# Patient Record
Sex: MALE | Race: WHITE | Employment: UNEMPLOYED | URBAN - METROPOLITAN AREA
[De-identification: names, ages, dates, MRNs, and addresses within clinical notes are randomized per-mention and may not be internally consistent; named-entity substitution may affect disease eponyms.]

---

## 2018-04-06 ENCOUNTER — THERAPY VISIT (OUTPATIENT)
Dept: PHYSICAL THERAPY | Facility: CLINIC | Age: 23
End: 2018-04-06
Payer: COMMERCIAL

## 2018-04-06 DIAGNOSIS — M25.511 CHRONIC RIGHT SHOULDER PAIN: Primary | ICD-10-CM

## 2018-04-06 DIAGNOSIS — G89.29 CHRONIC RIGHT SHOULDER PAIN: Primary | ICD-10-CM

## 2018-04-06 PROCEDURE — 97110 THERAPEUTIC EXERCISES: CPT | Mod: GP | Performed by: PHYSICAL THERAPIST

## 2018-04-06 PROCEDURE — 97161 PT EVAL LOW COMPLEX 20 MIN: CPT | Mod: GP | Performed by: PHYSICAL THERAPIST

## 2018-04-06 PROCEDURE — 97112 NEUROMUSCULAR REEDUCATION: CPT | Mod: GP | Performed by: PHYSICAL THERAPIST

## 2018-04-06 NOTE — LETTER
[facility]  [address]  [place] [address]  [phone]    2018    Re: Alexis Welsh   :   1995  MRN:  [id number]   REFERRING PHYSICIAN:   [facility]    Date of Initial Evaluation:  18  Visits:  Rxs Used: 1  Reason for Referral:  Chronic right shoulder pain    [facility] Initial Evaluation    Subjective:  HPI Comments: JUAN  37    Patient is planning surgery with Dr. Dinero in the Fall of   Patient is a 22 year old male presenting with rehab right shoulder hpi. The history is provided by the patient. No  was used.   Alexis Welsh is a 22 year old male with a right shoulder condition.  Condition occurred with:  Other (Lifting weights and body weight dips ).  Condition occurred: during recreation/sport.  This is a chronic condition  2017 .    Patient reports pain:  Anterior, lateral and posterior.  Radiates to:  Upper arm.  Pain is described as aching and is intermittent and reported as 5/10 (Constant 2/10 pain ).  Associated symptoms:  Painful arc, loss of motion/stiffness and loss of strength. Pain is the same all the time.  Symptoms are exacerbated by using arm overhead, using arm behind back, using arm at shoulder level, lifting, lying on extremity and carrying and relieved by rest.  Since onset symptoms are unchanged.  Special tests:  X-ray and MRI.      General health as reported by patient is good.  Pertinent medical history includes:  Overweight.  Medical allergies: no.  Other surgeries include:  Orthopedic surgery (wrist/forearm surgery ).  Current medications:  None as reported by the patient.  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  Barriers include:  None as reported by the patient.  Red flags:  None as reported by the patient.    Objective:  Standing Alignment:     Cervical/Thoracic:  Forward head  Shoulder/UE:  Rounded shoulders and scapular winging R    Gait:    Gait Type:  Normal   Weight Bearing Status:  WBAT   Assistive Devices:  None  Flexibility/Screens:   Negative screens: Cervical   Neurological: He is alert. He has normal strength and normal reflexes. No cranial nerve deficit or sensory deficit.     Shoulder Evaluation:  ROM:    PROM:    Flexion:  Left:  170    Right: 170    Internal Rotation:  Left:  90    Right:  90  External Rotation:  Left:  90    Right:  80    Strength:  : weak and painful ER, IR, Adduction and Abduction.     Assessment/Plan:    Patient is a 22 year old male with right side shoulder complaints.    Patient has the following significant findings with corresponding treatment plan.                Diagnosis 1:  R shoulder pain, Labrum tear, paralabral cyst, scap dyskinesia       Therapy Evaluation Codes:   1) History comprised of:   Personal factors that impact the plan of care:      Past/current experiences, Time since onset of symptoms and Work status.    Comorbidity factors that impact the plan of care are:      None.     Medications impacting care: None.  2) Examination of Body Systems comprised of:   Body structures and functions that impact the plan of care:      Shoulder.   Activity limitations that impact the plan of care are:      Dressing, Lifting, Working, Sleeping and Laying down.  3) Clinical presentation characteristics are:   Stable/Uncomplicated.  4) Decision-Making    Low complexity using standardized patient assessment instrument and/or measureable assessment of functional outcome.    Cumulative Therapy Evaluation is: Low complexity.  Previous and current functional limitations:  (See Goal Flow Sheet for this information)    Short term and Long term goals: (See Goal Flow Sheet for this information)   Communication ability:  Patient appears to be able to clearly communicate and understand verbal and written communication and follow  directions correctly.  Treatment Explanation - The following has been discussed with the patient:   RX ordered/plan of care  Anticipated outcomes  Possible risks and side effects  This patient would benefit from PT intervention to resume normal activities.   Rehab potential is good.    Frequency:  1 X week, to every other week once daily  Duration:  for 12 weeks  Discharge Plan:  Achieve all LTG.  Independent in home treatment program.  Reach maximal therapeutic benefit.  Follow up with Dr. Dinero in July 2018.   Rehab focus to self directed care, emphasis to scap stability and endurance RTC strength     Thank you for your referral.    INQUIRIES  Therapist: Yon Heath PT  [facility]  [address] [place] [address]  Phone: [phone]  Fax: [phone]

## 2018-04-08 PROBLEM — M25.511 CHRONIC RIGHT SHOULDER PAIN: Status: ACTIVE | Noted: 2018-04-08

## 2018-04-08 PROBLEM — G89.29 CHRONIC RIGHT SHOULDER PAIN: Status: ACTIVE | Noted: 2018-04-08

## 2018-04-09 NOTE — PROGRESS NOTES
[facility] Initial Evaluation  Subjective:  HPI Comments: JUAN  37/100      Patient is planning surgery with Dr. Dinero in the Fall of 2018    Patient is a 22 year old male presenting with rehab right shoulder hpi. The history is provided by the patient. No  was used.   Alexis Welsh is a 22 year old male with a right shoulder condition.  Condition occurred with:  Other (Lifting weights and body weight dips ).  Condition occurred: during recreation/sport.  This is a chronic condition  January 2017 .    Patient reports pain:  Anterior, lateral and posterior.  Radiates to:  Upper arm.  Pain is described as aching and is intermittent and reported as 5/10 (Constant 2/10 pain ).  Associated symptoms:  Painful arc, loss of motion/stiffness and loss of strength. Pain is the same all the time.  Symptoms are exacerbated by using arm overhead, using arm behind back, using arm at shoulder level, lifting, lying on extremity and carrying and relieved by rest.  Since onset symptoms are unchanged.  Special tests:  X-ray and MRI.      General health as reported by patient is good.  Pertinent medical history includes:  Overweight.  Medical allergies: no.  Other surgeries include:  Orthopedic surgery (wrist/forearm surgery ).  Current medications:  None as reported by the patient.  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    Barriers include:  None as reported by the patient.    Red flags:  None as reported by the patient.                        Objective:  Standing Alignment:    Cervical/Thoracic:  Forward head  Shoulder/UE:  Rounded shoulders and scapular winging R              Gait:    Gait Type:  Normal   Weight Bearing Status:  WBAT   Assistive Devices:  None      Flexibility/Screens:   Negative screens: Cervical           Neurological: He is alert. He has normal strength and normal  reflexes. No cranial nerve deficit or sensory deficit.                      Shoulder Evaluation:  ROM:    PROM:    Flexion:  Left:  170    Right: 170          Internal Rotation:  Left:  90    Right:  90  External Rotation:  Left:  90    Right:  80                    Strength:  : weak and painful ER, IR, Adduction and Abduction.                                                                General     ROS    Assessment/Plan:    Patient is a 22 year old male with right side shoulder complaints.    Patient has the following significant findings with corresponding treatment plan.                Diagnosis 1:  R shoulder pain, Labrum tear, paralabral cyst, scap dyskinesia       Therapy Evaluation Codes:   1) History comprised of:   Personal factors that impact the plan of care:      Past/current experiences, Time since onset of symptoms and Work status.    Comorbidity factors that impact the plan of care are:      None.     Medications impacting care: None.  2) Examination of Body Systems comprised of:   Body structures and functions that impact the plan of care:      Shoulder.   Activity limitations that impact the plan of care are:      Dressing, Lifting, Working, Sleeping and Laying down.  3) Clinical presentation characteristics are:   Stable/Uncomplicated.  4) Decision-Making    Low complexity using standardized patient assessment instrument and/or measureable assessment of functional outcome.  Cumulative Therapy Evaluation is: Low complexity.    Previous and current functional limitations:  (See Goal Flow Sheet for this information)    Short term and Long term goals: (See Goal Flow Sheet for this information)     Communication ability:  Patient appears to be able to clearly communicate and understand verbal and written communication and follow directions correctly.  Treatment Explanation - The following has been discussed with the patient:   RX ordered/plan of care  Anticipated outcomes  Possible risks and side  effects  This patient would benefit from PT intervention to resume normal activities.   Rehab potential is good.    Frequency:  1 X week, to every other week once daily  Duration:  for 12 weeks  Discharge Plan:  Achieve all LTG.  Independent in home treatment program.  Reach maximal therapeutic benefit.    Follow up with Dr. Dinero in July 2018.     Rehab focus to self directed care, emphasis to scap stability and endurance RTC strength     Please refer to the daily flowsheet for treatment today, total treatment time and time spent performing 1:1 timed codes.

## 2018-04-20 ENCOUNTER — THERAPY VISIT (OUTPATIENT)
Dept: PHYSICAL THERAPY | Facility: CLINIC | Age: 23
End: 2018-04-20
Payer: COMMERCIAL

## 2018-04-20 DIAGNOSIS — G89.29 CHRONIC RIGHT SHOULDER PAIN: ICD-10-CM

## 2018-04-20 DIAGNOSIS — M25.511 CHRONIC RIGHT SHOULDER PAIN: ICD-10-CM

## 2018-04-20 PROCEDURE — 97110 THERAPEUTIC EXERCISES: CPT | Mod: GP | Performed by: PHYSICAL THERAPIST

## 2018-04-20 PROCEDURE — 97112 NEUROMUSCULAR REEDUCATION: CPT | Mod: GP | Performed by: PHYSICAL THERAPIST

## 2018-05-15 ENCOUNTER — THERAPY VISIT (OUTPATIENT)
Dept: PHYSICAL THERAPY | Facility: CLINIC | Age: 23
End: 2018-05-15
Payer: COMMERCIAL

## 2018-05-15 DIAGNOSIS — M25.511 CHRONIC RIGHT SHOULDER PAIN: ICD-10-CM

## 2018-05-15 DIAGNOSIS — G89.29 CHRONIC RIGHT SHOULDER PAIN: ICD-10-CM

## 2018-05-15 PROCEDURE — 97112 NEUROMUSCULAR REEDUCATION: CPT | Mod: GP | Performed by: PHYSICAL THERAPIST

## 2018-05-15 PROCEDURE — 97110 THERAPEUTIC EXERCISES: CPT | Mod: GP | Performed by: PHYSICAL THERAPIST

## 2018-05-15 NOTE — PROGRESS NOTES
Subjective:  HPI                    Objective:  System    Physical Exam    General     ROS    Assessment/Plan:    PROGRESS  REPORT    Progress reporting period is from April 8. 2018 to May 15. 2018. 3 visits     SUBJECTIVE  Subjective: Patient is painfree. He is able to lay on the R shoulder at night and pretty much full go with day to day activity. SPADI 3/100. Patient happy with his outcome.     Current Pain level: 1/10   Initial Pain level: 7/10   Changes in function: Yes, see goal flow sheet for change in function    ;   ,         OBJECTIVE  Objective: No painful arc. ROM restored, including posterior reach to T7 bialterally . MMT 5-/5 strength, slight sore with adduction. Endurance strength equal . Recommend to hold off weight room work still at this point. Continue strengthening light weight , high reps strength program 3x/weekfor the next month. Consult plan with Dr. Dinero in July 2018. PT consult in 2-3 weeks.       ASSESSMENT/PLAN  Updated problem list and treatment plan: Diagnosis 1:  R shoulder internal derangement     STG/LTGs have been met or progress has been made towards goals:  Yes (See Goal flow sheet completed today.)  Assessment of Progress: The patient's condition is improving.  The patient's condition has potential to improve.  Self Management Plans:  Patient has been instructed in a home treatment program.  I have re-evaluated this patient and find that the nature, scope, duration and intensity of the therapy is appropriate for the medical condition of the patient.        Recommendations:  This patient would benefit from further evaluation.    Please refer to the daily flowsheet for treatment today, total treatment time and time spent performing 1:1 timed codes.

## 2018-05-15 NOTE — LETTER
[facility]  [address]  [place] [address]  [phone]    May 16, 2018    Re: Alexis Welsh   :   1995  MRN:  [id number]   REFERRING PHYSICIAN:   [facility]    Date of Initial Evaluation:  18  Visits:  Rxs Used: 3  Reason for Referral:  Chronic right shoulder pain    PROGRESS  REPORT  Progress reporting period is from  to May 15. 2018. 3 visits     SUBJECTIVE  Subjective: Patient is painfree. He is able to lay on the R shoulder at night and pretty much full go with day to day activity. SPADI 3/100. Patient happy with his outcome.   Current Pain level: 10   Initial Pain level: 7/10   Changes in function: Yes, see goal flow sheet for change in function     OBJECTIVE  Objective: No painful arc. ROM restored, including posterior reach to T7 bialterally . MMT 5-/5 strength, slight sore with adduction. Endurance strength equal . Recommend to hold off weight room work still at this point. Continue strengthening light weight , high reps strength program 3x/weekfor the next month. Consult plan with Dr. Dinero in 2018. PT consult in 2-3 weeks.       ASSESSMENT/PLAN  Updated problem list and treatment plan: Diagnosis 1:  R shoulder internal derangement     STG/LTGs have been met or progress has been made towards goals:  Yes (See Goal flow sheet completed today.)  Assessment of Progress: The patient's condition is improving.  The patient's condition has potential to improve.  Self Management Plans:  Patient has been instructed in a home treatment program.  I have re-evaluated this patient and find that the nature, scope, duration and intensity of the therapy is appropriate for the medical condition of the patient.    Recommendations:  This patient would benefit from further evaluation.    Thank you for your referral.    INQUIRIES  Therapist: Yon Heath PT  [facility]  [address]  [place] [address]  Phone:  [phone]  Fax: [phone]

## 2018-06-20 ENCOUNTER — OFFICE VISIT (OUTPATIENT)
Dept: DERMATOLOGY | Facility: CLINIC | Age: 23
End: 2018-06-20
Payer: COMMERCIAL

## 2018-06-20 DIAGNOSIS — D48.5 NEOPLASM OF UNCERTAIN BEHAVIOR OF SKIN: Primary | ICD-10-CM

## 2018-06-20 DIAGNOSIS — Z12.83 SKIN CANCER SCREENING: ICD-10-CM

## 2018-06-20 RX ORDER — LIDOCAINE HYDROCHLORIDE AND EPINEPHRINE 10; 10 MG/ML; UG/ML
1 INJECTION, SOLUTION INFILTRATION; PERINEURAL ONCE
Qty: 1 ML | Refills: 0 | OUTPATIENT
Start: 2018-06-20 | End: 2018-06-20

## 2018-06-20 ASSESSMENT — PAIN SCALES - GENERAL
PAINLEVEL: NO PAIN (0)
PAINLEVEL: NO PAIN (0)

## 2018-06-20 NOTE — PATIENT INSTRUCTIONS
The ABCDEs of Melanoma    Skin cancer can develop anywhere on the skin. Ask someone for help when checking your skin, especially in hard to see places. If you notice a mole different from others, or that changes, enlarges, itches, or bleeds (even if it is small), you should see a dermatologist.            Wound Care After a Biopsy    What is a skin biopsy?  A skin biopsy allows the doctor to examine a very small piece of tissue under the microscope to determine the diagnosis and the best treatment for the skin condition. A local anesthetic (numbing medicine)  is injected with a very small needle into the skin area to be tested. A small piece of skin is taken from the area. Sometimes a suture (stitch) is used.     What are the risks of a skin biopsy?  I will experience scar, bleeding, swelling, pain, crusting and redness. I may experience incomplete removal or recurrence. Risks of this procedure are excessive bleeding, bruising, infection, nerve damage, numbness, thick (hypertrophic or keloidal) scar and non-diagnostic biopsy.    How should I care for my wound for the first 24 hours?    Keep the wound dry and covered for 24 hours    If it bleeds, hold direct pressure on the area for 15 minutes. If bleeding does not stop then go to the emergency room    Avoid strenuous exercise the first 1-2 days or as your doctor instructs you    How should I care for the wound after 24 hours?    After 24 hours, remove the bandage    You may bathe or shower as normal    If you had a scalp biopsy, you can shampoo as usual and can use shower water to clean the biopsy site daily    Clean the wound twice a day with gentle soap and water    Do not scrub, be gentle    Apply white petroleum/Vaseline after cleaning the wound with a cotton swab or a clean finger, and keep the site covered with a Bandaid /bandage. Bandages are not necessary with a scalp biopsy    If you are unable to cover the site with a Bandaid /bandage, re-apply  ointment 2-3 times a day to keep the site moist. Moisture will help with healing    Avoid strenuous activity for first 1-2 days    Avoid lakes, rivers, pools, and oceans until the stitches are removed or the site is healed    How do I clean my wound?    Wash hands thoroughly with soap or use hand  before all wound care    Clean the wound with gentle soap and water    Apply white petroleum/Vaseline  to wound after it is clean    Replace the Bandaid /bandage to keep the wound covered for the first few days or as instructed by your doctor    If you had a scalp biopsy, warm shower water to the area on a daily basis should suffice    What should I use to clean my wound?     Cotton-tipped applicators (Qtips )    White petroleum jelly (Vaseline ). Use a clean new container and use Q-tips to apply.    Bandaids   as needed    Gentle soap     How should I care for my wound long term?    Do not get your wound dirty    Keep up with wound care for one week or until the area is healed.    A small scab will form and fall off by itself when the area is completely healed. The area will be red and will become pink in color as it heals. Sun protection is very important for how your scar will turn out. Sunscreen with an SPF 30 or greater is recommended once the area is healed.    You should have some soreness but it should be mild and slowly go away over several days. Talk to your doctor about using tylenol for pain,    When should I call my doctor?  If you have increased:     Pain or swelling    Pus or drainage (clear or slightly yellow drainage is ok)    Temperature over 100F    Spreading redness or warmth around wound    When will I hear about my results?  The biopsy results can take 2-3 weeks to come back. The clinic will call you with the results, send you a Celles message, or have you schedule a follow-up clinic or phone time to discuss the results. Contact our clinics if you do not hear from us in 3 weeks.     Who  should I call with questions?    [facility]: [phone]     [facility]: [phone]    For urgent needs outside of business hours call the [facility] at [phone] and ask for the dermatology resident on call

## 2018-06-20 NOTE — NURSING NOTE
Lidocaine 1%  1mL once for one use, starting 6/20/2018 ending 6/20/2018,  2mL disp, R-0, injection  Injected by Ely Bruno MA

## 2018-06-20 NOTE — PROGRESS NOTES
[facility] Dermatology Note      Dermatology Problem List:  1.NUB - left upper glute -s/p bx 6/20/18    CC:   Chief Complaint   Patient presents with     Skin Check     Alexis is here today for a skin check- has an area of concern on his back.          Encounter Date: Jun 20, 2018    History of Present Illness:  Mr. Alexis Welsh is a 23 year old male who presents for an upper body skin exam. He has one lesion of concern on his left lower back. He is not sure how long it has been there. He says his girlfriend thinks it looks abnml and would like to have it checked. He has no sx associated with this site. He does work outside. He does not often wear sunscreen. He has no personal or fhx of skin cancer and is otherwise healthy.     Past Medical History:   Patient Active Problem List   Diagnosis     Chronic right shoulder pain     History reviewed. No pertinent past medical history.  History reviewed. No pertinent surgical history.    Social History:  The patient denies use of tanning beds. Does not typically wear sunscreen. He works outside doing con    Family History:  There is no family history of skin cancer. There is no family history of melanoma.    Medications:  No current outpatient prescriptions on file.     No Known Allergies      Review of Systems:  -Constitutional: The patient denies fatigue, fevers, chills, unintended weight loss, and night sweats.  -Heme/Lymph: no concerning bumps, no bleeding or bruising problems   -Skin: As above in HPI. No additional skin concerns.    Physical exam:  Vitals: There were no vitals taken for this visit.  GEN: This is a well developed, well-nourished male in no acute distress, in a pleasant mood.    SKIN: Waist-up skin, which includes the head/face, neck, both arms, chest, back, abdomen, digits and/or nails was examined.  -4mm irregular shaped brown nevus on the left upper glute  -Montes's skin type II, very few nevi  -No other lesions of concern on  areas examined.     Impression/Plan:  1. Neoplasm of uncertain behavior on the left upper glute. The differential diagnosis includes MM vs atypia vs dermal nevus.     Shave biopsy:  After discussion of benefits and risks including but not limited to bleeding/bruising, pain/swelling, infection, scar, incomplete removal, nerve damage/numbness, recurrence, and non-diagnostic biopsy, written consent, verbal consent and photographs were obtained. Time-out was performed. The area was cleaned with isopropyl alcohol.  was injected to obtain adequate anesthesia of the lesion on the left upper glute. 1ml of 1% lidocaine was injected to obtain adequate anesthesia. A  shave biopsy was performed. Hemostasis was achieved with aluminium chloride. Vaseline and a sterile dressing were applied. The patient tolerated the procedure and no complications were noted. The patient was provided with verbal and written post care instructions.      2. Skin Cancer Screening    Sunscreen: Apply 20 minutes prior to going outdoors and reapply every two hours, when wet or sweating. We recommend using an SPF 30 or higher, and to use one that is water resistant.       ABCD's of melanoma were reviewed with patient and handout provided.     CC Dr. Lizarraga on close of this encounter.  Follow-up prn for new or changing lesions.       Staff Involved:  Staff Only  All risks, benefits and alternatives were discussed with patient.  Patient is in agreement and understands the assessment and plan.  All questions were answered.    Princess Castelan PA-C  [facility]: Phone: [phone], Fax: [phone]

## 2018-06-20 NOTE — LETTER
6/20/2018       RE: Alexis Welsh  [address] [place] [address]     Dear Colleague,    Thank you for referring your patient, Alexis Welsh, to the [facility] DERMATOLOGY at [facility]. Please see a copy of my visit note below.    [facility] Dermatology Note      Dermatology Problem List:  1.NUB - left upper glute -s/p bx 6/20/18    CC:   Chief Complaint   Patient presents with     Skin Check     Alexis is here today for a skin check- has an area of concern on his back.          Encounter Date: Jun 20, 2018    History of Present Illness:  Mr. Alexis Welsh is a 23 year old male who presents for an upper body skin exam. He has one lesion of concern on his left lower back. He is not sure how long it has been there. He says his girlfriend thinks it looks abnml and would like to have it checked. He has no sx associated with this site. He does work outside. He does not often wear sunscreen. He has no personal or fhx of skin cancer and is otherwise healthy.     Past Medical History:   Patient Active Problem List   Diagnosis     Chronic right shoulder pain     History reviewed. No pertinent past medical history.  History reviewed. No pertinent surgical history.    Social History:  The patient denies use of tanning beds. Does not typically wear sunscreen. He works outside doing con    Family History:  There is no family history of skin cancer. There is no family history of melanoma.    Medications:  No current outpatient prescriptions on file.     No Known Allergies      Review of Systems:  -Constitutional: The patient denies fatigue, fevers, chills, unintended weight loss, and night sweats.  -Heme/Lymph: no concerning bumps, no bleeding or bruising problems   -Skin: As above in HPI. No additional skin concerns.    Physical exam:  Vitals: There were no vitals taken for this visit.  GEN: This is a well developed, well-nourished male in no acute distress, in a  pleasant mood.    SKIN: Waist-up skin, which includes the head/face, neck, both arms, chest, back, abdomen, digits and/or nails was examined.  -4mm irregular shaped brown nevus on the left upper glute  -Montes's skin type II, very few nevi  -No other lesions of concern on areas examined.     Impression/Plan:  1. Neoplasm of uncertain behavior on the left upper glute. The differential diagnosis includes MM vs atypia vs dermal nevus.     Shave biopsy:  After discussion of benefits and risks including but not limited to bleeding/bruising, pain/swelling, infection, scar, incomplete removal, nerve damage/numbness, recurrence, and non-diagnostic biopsy, written consent, verbal consent and photographs were obtained. Time-out was performed. The area was cleaned with isopropyl alcohol.  was injected to obtain adequate anesthesia of the lesion on the left upper glute. 1ml of 1% lidocaine was injected to obtain adequate anesthesia. A  shave biopsy was performed. Hemostasis was achieved with aluminium chloride. Vaseline and a sterile dressing were applied. The patient tolerated the procedure and no complications were noted. The patient was provided with verbal and written post care instructions.      2. Skin Cancer Screening    Sunscreen: Apply 20 minutes prior to going outdoors and reapply every two hours, when wet or sweating. We recommend using an SPF 30 or higher, and to use one that is water resistant.       ABCD's of melanoma were reviewed with patient and handout provided.     CC Dr. Lizarraga on close of this encounter.  Follow-up prn for new or changing lesions.       Staff Involved:  Staff Only  All risks, benefits and alternatives were discussed with patient.  Patient is in agreement and understands the assessment and plan.  All questions were answered.    Princess Castelan PA-C  [facility] [place]note 2[facility]: Phone: [phone], Fax: [phone]

## 2018-06-20 NOTE — MR AVS SNAPSHOT
After Visit Summary   6/20/2018    Alexis Welsh    MRN: [id number]           Patient Information     Date Of Birth          1995        Visit Information        Provider Department      6/20/2018 6:00 PM Princess Castelan PA-C [facility] Dermatology        Today's Diagnoses     Neoplasm of uncertain behavior of skin    -  1    Skin cancer screening          Care Instructions          The ABCDEs of Melanoma    Skin cancer can develop anywhere on the skin. Ask someone for help when checking your skin, especially in hard to see places. If you notice a mole different from others, or that changes, enlarges, itches, or bleeds (even if it is small), you should see a dermatologist.            Wound Care After a Biopsy    What is a skin biopsy?  A skin biopsy allows the doctor to examine a very small piece of tissue under the microscope to determine the diagnosis and the best treatment for the skin condition. A local anesthetic (numbing medicine)  is injected with a very small needle into the skin area to be tested. A small piece of skin is taken from the area. Sometimes a suture (stitch) is used.     What are the risks of a skin biopsy?  I will experience scar, bleeding, swelling, pain, crusting and redness. I may experience incomplete removal or recurrence. Risks of this procedure are excessive bleeding, bruising, infection, nerve damage, numbness, thick (hypertrophic or keloidal) scar and non-diagnostic biopsy.    How should I care for my wound for the first 24 hours?    Keep the wound dry and covered for 24 hours    If it bleeds, hold direct pressure on the area for 15 minutes. If bleeding does not stop then go to the emergency room    Avoid strenuous exercise the first 1-2 days or as your doctor instructs you    How should I care for the wound after 24 hours?    After 24 hours, remove the bandage    You may bathe or shower as normal    If you had a scalp biopsy, you can shampoo as usual and can use  shower water to clean the biopsy site daily    Clean the wound twice a day with gentle soap and water    Do not scrub, be gentle    Apply white petroleum/Vaseline after cleaning the wound with a cotton swab or a clean finger, and keep the site covered with a Bandaid /bandage. Bandages are not necessary with a scalp biopsy    If you are unable to cover the site with a Bandaid /bandage, re-apply ointment 2-3 times a day to keep the site moist. Moisture will help with healing    Avoid strenuous activity for first 1-2 days    Avoid lakes, rivers, pools, and oceans until the stitches are removed or the site is healed    How do I clean my wound?    Wash hands thoroughly with soap or use hand  before all wound care    Clean the wound with gentle soap and water    Apply white petroleum/Vaseline  to wound after it is clean    Replace the Bandaid /bandage to keep the wound covered for the first few days or as instructed by your doctor    If you had a scalp biopsy, warm shower water to the area on a daily basis should suffice    What should I use to clean my wound?     Cotton-tipped applicators (Qtips )    White petroleum jelly (Vaseline ). Use a clean new container and use Q-tips to apply.    Bandaids   as needed    Gentle soap     How should I care for my wound long term?    Do not get your wound dirty    Keep up with wound care for one week or until the area is healed.    A small scab will form and fall off by itself when the area is completely healed. The area will be red and will become pink in color as it heals. Sun protection is very important for how your scar will turn out. Sunscreen with an SPF 30 or greater is recommended once the area is healed.    You should have some soreness but it should be mild and slowly go away over several days. Talk to your doctor about using tylenol for pain,    When should I call my doctor?  If you have increased:     Pain or swelling    Pus or drainage (clear or slightly yellow  drainage is ok)    Temperature over 100F    Spreading redness or warmth around wound    When will I hear about my results?  The biopsy results can take 2-3 weeks to come back. The clinic will call you with the results, send you a [employer] message, or have you schedule a follow-up clinic or phone time to discuss the results. Contact our clinics if you do not hear from us in 3 weeks.     Who should I call with questions?    [facility]: [phone]     [facility]: [phone]    For urgent needs outside of business hours call the [facility] at [phone] and ask for the dermatology resident on call              Follow-ups after your visit        Follow-up notes from your care team     Return if symptoms worsen or fail to improve.      Who to contact     Please call your clinic at [phone] to:    Ask questions about your health    Make or cancel appointments    Discuss your medicines    Learn about your test results    Speak to your doctor            Additional Information About Your Visit        [employer] Information     [employer] is an electronic gateway that provides easy, online access to your medical records. With [employer], you can request a clinic appointment, read your test results, renew a prescription or communicate with your care team.     To sign up for [employer] visit the website at [email or web address]   You will be asked to enter the access code listed below, as well as some personal information. Please follow the directions to create your username and password.     Your access code is: [email or web address]  Expires: 2018  6:30 AM     Your access code will  in 90 days. If you need help or a new code, please contact your [facility] or call [phone] for assistance.        Care EveryWhere ID     This is your Care EveryWhere ID. This could be used by other organizations to access your [place]  medical records  [id number]         Blood Pressure from Last 3 Encounters:   No data found for BP    Weight from Last 3 Encounters:   No data found for Wt              We Performed the Following     BIOPSY SKIN/SUBQ/MUC MEM, SINGLE LESION     Dermatological path order and indications          Today's Medication Changes          These changes are accurate as of 6/20/18  6:45 PM.  If you have any questions, ask your nurse or doctor.               Start taking these medicines.        Dose/Directions    lidocaine 1% with EPINEPHrine 1:100,000 1 %-1:237097 injection   Used for:  Neoplasm of uncertain behavior of skin   Started by:  Princess Castelan PA-C        Dose:  1 mL   Inject 1 mL into the skin once for 1 dose   Quantity:  1 mL   Refills:  0            Where to get your medicines      Some of these will need a paper prescription and others can be bought over the counter.  Ask your nurse if you have questions.     You don't need a prescription for these medications     lidocaine 1% with EPINEPHrine 1:100,000 1 %-1:513245 injection                Primary Care Provider Office Phone # Fax #    [facility] [phone]       [address] [id number]        Equal Access to Services     [facility]IN [facility] Hospital[facility]EL AH: Hadii adams stubbs Soroberta, waelizabethda ketan, qaybta kaalmashira lees, marely davis. So [facility] [phone].    ATENCIÓN: Si habla español, tiene a restrepo disposición servicios gratuitos de asistencia lingüística. Llame al [phone].    We comply with applicable federal civil rights laws and [place] laws. We do not discriminate on the basis of race, color, national origin, age, disability, sex, sexual orientation, or gender identity.            Thank you!     Thank you for choosing [facility] DERMATOLOGY  for your care. Our goal is always to provide you with excellent care. Hearing back from our patients is one way we can continue to improve our  services. Please take a few minutes to complete the written survey that you may receive in the mail after your visit with us. Thank you!             Your Updated Medication List - Protect others around you: Learn how to safely use, store and throw away your medicines at www.disposemymeds.org.          This list is accurate as of 6/20/18  6:45 PM.  Always use your most recent med list.                   Brand Name Dispense Instructions for use Diagnosis    lidocaine 1% with EPINEPHrine 1:100,000 1 %-1:036333 injection     1 mL    Inject 1 mL into the skin once for 1 dose    Neoplasm of uncertain behavior of skin

## 2018-06-20 NOTE — NURSING NOTE
Dermatology Rooming Note    Alexis Welsh's goals for this visit include:   Chief Complaint   Patient presents with     Skin Check     Alexis is here today for a skin check- has an area of concern on his back.      Ely Bruno MA

## 2018-06-26 LAB — COPATH REPORT: NORMAL

## 2018-09-28 PROBLEM — G89.29 CHRONIC RIGHT SHOULDER PAIN: Status: RESOLVED | Noted: 2018-04-08 | Resolved: 2018-09-28

## 2018-09-28 PROBLEM — M25.511 CHRONIC RIGHT SHOULDER PAIN: Status: RESOLVED | Noted: 2018-04-08 | Resolved: 2018-09-28

## 2020-03-06 ENCOUNTER — THERAPY VISIT (OUTPATIENT)
Dept: PHYSICAL THERAPY | Facility: CLINIC | Age: 25
End: 2020-03-06
Payer: COMMERCIAL

## 2020-03-06 DIAGNOSIS — M54.50 LOW BACK PAIN: ICD-10-CM

## 2020-03-06 PROCEDURE — 97110 THERAPEUTIC EXERCISES: CPT | Mod: GP | Performed by: PHYSICAL THERAPIST

## 2020-03-06 PROCEDURE — 97530 THERAPEUTIC ACTIVITIES: CPT | Mod: GP | Performed by: PHYSICAL THERAPIST

## 2020-03-06 PROCEDURE — 97161 PT EVAL LOW COMPLEX 20 MIN: CPT | Mod: GP | Performed by: PHYSICAL THERAPIST

## 2020-03-06 NOTE — PROGRESS NOTES
[facility] Initial Evaluation  Subjective:  The history is provided by the patient.   Patient Health History  Alexis Welsh being seen for low back pain that sometimes radiates into his R or L buttock. Pain started last August when he was helping his family install a waterfall. Reports he tried to stop something heavy from moving, when he felt a sharp pain in his back. 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 He also completes a lot of shoveling at work, which hurts a lot. .     Problem began: 8/1/2019.   Problem occurred: at work   Pain is reported as 7/10 on pain scale.  General health as reported by patient is fair.  Pertinent medical history includes: history of fractures and overweight. Other medical history details: Torn labrum (right shoulder).   Red flags:  None as reported by patient.     Surgeries include:  Orthopedic surgery. Other surgery history details: Right radius + ulna (2013).    Current medications:  Anti-inflammatory and muscle relaxants.    Current occupation is Construction.   Primary job tasks include:  Lifting/carrying, driving, operating a machine/assembly, prolonged standing, repetitive tasks and pushing/pulling.                  Therapist Generated HPI Evaluation         Type of problem:  Lumbar.    This is a chronic condition.  Condition occurred with:  Lifting and twisting.    Patient reports pain:  Lumbar spine left.  Pain is described as sharp and aching and is intermittent.  Pain radiates to:  Gluteals left and gluteals right (L>R). Pain is the same all the time.  Since onset symptoms are gradually worsening.  Symptoms are exacerbated by bending, sitting, lifting, twisting and lying down  and relieved by activity/movement, muscle relaxants and NSAID's.  Special tests included:  X-ray (Per pt, x-ray showed no fractures. Unremarkable imaging).    Restrictions due to condition include:  Working in normal job without  "restrictions.  Barriers include:  None as reported by patient.      Oswestry Score: 22 %                 Objective:  Standing Alignment:        Lumbar:  Normal            Gait:    Gait Type:  Normal   Assistive Devices:  None                 Lumbar/SI Evaluation  ROM:    AROM Lumbar:   Flexion:            To mid shin, minimal pain in low back. Reports \"tightness in HS\"  Ext:                    25%, limited by pain   Side Bend:        Left:  To knee, no pain    Right:  To knee, no pain  Rotation:           Left:  WNL, no pain    Right:  WNL, no pain  Side Glide:        Left:     Right:             Lumbar DTR's:  Lumbar dtr's: Pt reports he has always had difficulty getting his reflexes to fire. Jendrassik maneauver needed to achieve quad reflex.  L4 (Quad):  Left:  1   Right:  1  S1 (Achilles):  Left:  0   Right:  0    Lumbar Dermtomes:  normal (Pt able to sense light touch. Denies any feeling of tingling, numbness, or burning sensations into bilateral LEs.)                Neural Tension/Mobility:    Left side:  SLR and SLR w/DF positive.   Right side:   SLR w/DF and SLR positive.  Lumbar Palpation:      Tenderness not present at Left:    Quadratus Lumborum; Erector Spinae or Gluteus Medius    Tenderness not present at Right:  Quadratus Lumborum; Erector Spinae or Gluteus Medius  Functional Tests:  Core strength and proprioception lumbar: Squat: Sitting below // gives him pain.         Lumbar Provocation:      Left negative with:  PROM hip    Right negative with:  PROM hip    SI joint/Sacrum:      Provocation:  Negative    Left negative at:    Sacral thrust    Right negative at:  Sacral thrust                                      Hip Evaluation    Hip Strength:    Flexion:   Left: 4/5   ++  Pain:  Right: 4/5   ++  Pain:                    Extension:  Left: 5/5  -  Pain:Right: 5/5    -  Pain:    Abduction:  Left: 5/5    -   Pain:Right: 5/5   -   Pain:        Knee Flexion:  Left: 5/5   -  Pain:Right: 5/5   -  " Pain:  Knee Extension:  Left: 4+/5   ++  Pain:Right: 5/5    -  Pain:        Hip Special Testing:      Left hip negative for the following special tests:  Josue or Fadir/Labrum  Right hip negative for the following special tests:  Josue or Fadir/Labrum    Hip Palpation:  Normal                    General     ROS    Assessment/Plan:    Patient is a 24 year old male with lumbar complaints.    Patient has the following significant findings with corresponding treatment plan.                Diagnosis 1:  Low back pain  Pain -  hot/cold therapy, US, electric stimulation, mechanical traction, manual therapy, self management, education, directional preference exercise and home program  Decreased strength - therapeutic exercise, therapeutic activities and home program  Impaired muscle performance - neuro re-education and home program  Decreased function - therapeutic activities and home program  Impaired posture - neuro re-education and home program    Therapy Evaluation Codes:   1) History comprised of:   Personal factors that impact the plan of care:      Profession.    Comorbidity factors that impact the plan of care are:      None.     Medications impacting care: None.  2) Examination of Body Systems comprised of:   Body structures and functions that impact the plan of care:      Lumbar spine.   Activity limitations that impact the plan of care are:      Bending, Lifting, Sitting, Squatting/kneeling and Working.  3) Clinical presentation characteristics are:   Stable/Uncomplicated.  4) Decision-Making    Low complexity using standardized patient assessment instrument and/or measureable assessment of functional outcome.  Cumulative Therapy Evaluation is: Low complexity.    Previous and current functional limitations:  (See Goal Flow Sheet for this information)    Short term and Long term goals: (See Goal Flow Sheet for this information)     Communication ability:  Patient appears to be able to clearly communicate and  understand verbal and written communication and follow directions correctly.  Treatment Explanation - The following has been discussed with the patient:   RX ordered/plan of care  Anticipated outcomes  Possible risks and side effects  This patient would benefit from PT intervention to resume normal activities.   Rehab potential is excellent.    Frequency:  1 X week, once daily  Duration:  for 6 weeks  Discharge Plan:  Achieve all LTG.  Independent in home treatment program.  Reach maximal therapeutic benefit.    Please refer to the daily flowsheet for treatment today, total treatment time and time spent performing 1:1 timed codes.

## 2020-03-06 NOTE — LETTER
[place] [facility] [place]  [address]  [place] [address]  [phone]    2020    Re: Alexis Welsh   :   1995  MRN:  [id number]   REFERRING PHYSICIAN:   Deborah PRASAD [facility] [place]    Date of Initial Evaluation:  3/6/20  Visits:  Rxs Used: 1  Reason for Referral:  Low back pain    [facility] Initial Evaluation    Subjective:  The history is provided by the patient.     Patient Health History  Alexis Welsh being seen for low back pain that sometimes radiates into his R or L buttock. Pain started last August when he was helping his family install a waterfall. Reports he tried to stop something heavy from moving, when he felt a sharp pain in his back. 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 He also completes a lot of shoveling at work, which hurts a lot. .   Problem began: 2019.   Problem occurred: at work   Pain is reported as 7/10 on pain scale.  General health as reported by patient is fair.  Pertinent medical history includes: history of fractures and overweight. Other medical history details: Torn labrum (right shoulder).   Red flags:  None as reported by patient.  Surgeries include:  Orthopedic surgery. Other surgery history details: Right radius + ulna ().    Current medications:  Anti-inflammatory and muscle relaxants.    Current occupation is Construction.   Primary job tasks include:  Lifting/carrying, driving, operating a machine/assembly, prolonged standing, repetitive tasks and pushing/pulling.                Therapist Generated HPI Evaluation  Type of problem:  Lumbar.  This is a chronic condition.  Condition occurred with:  Lifting and twisting.  Patient reports pain:  Lumbar spine left.  Pain is described as sharp and aching and is intermittent.  Pain radiates to:  Gluteals left and gluteals right (L>R). Pain is the same all the time.  Since onset symptoms are gradually worsening.  Symptoms are  "exacerbated by bending, sitting, lifting, twisting and lying down  and relieved by activity/movement, muscle relaxants and NSAID's.  Special tests included:  X-ray (Per pt, x-ray showed no fractures. Unremarkable imaging).    Restrictions due to condition include:  Working in normal job without restrictions.  Barriers include:  None as reported by patient.    Oswestry Score: 22 %                 Objective:  Standing Alignment:    Lumbar:  Normal    Gait:    Gait Type:  Normal   Assistive Devices:  None    Lumbar/SI Evaluation  ROM:    AROM Lumbar:   Flexion:            To mid shin, minimal pain in low back. Reports \"tightness in HS\"  Ext:                    25%, limited by pain   Side Bend:        Left:  To knee, no pain    Right:  To knee, no pain  Rotation:           Left:  WNL, no pain    Right:  WNL, no pain  Side Glide:        Left:     Right:     Lumbar DTR's:  Lumbar dtr's: Pt reports he has always had difficulty getting his reflexes to fire. Jendrassik maneauver needed to achieve quad reflex.  L4 (Quad):  Left:  1   Right:  1  S1 (Achilles):  Left:  0   Right:  0    Lumbar Dermtomes:  normal (Pt able to sense light touch. Denies any feeling of tingling, numbness, or burning sensations into bilateral LEs.)    Neural Tension/Mobility:    Left side:  SLR and SLR w/DF positive.   Right side:   SLR w/DF and SLR positive.    Lumbar Palpation:    Tenderness not present at Left:    Quadratus Lumborum; Erector Spinae or Gluteus Medius  Tenderness not present at Right:  Quadratus Lumborum; Erector Spinae or Gluteus Medius    Functional Tests:  Core strength and proprioception lumbar: Squat: Sitting below // gives him pain.     Lumbar Provocation:    Left negative with:  PROM hip  Right negative with:  PROM hip    SI joint/Sacrum:      Provocation:  Negative  Left negative at:    Sacral thrust  Right negative at:  Sacral thrust    Hip Evaluation    Hip Strength:    Flexion:   Left: 4/5   ++  Pain:  Right: 4/5   ++  " Pain:             Extension:  Left: 5/5  -  Pain:Right: 5/5    -  Pain:    Abduction:  Left: 5/5    -   Pain:Right: 5/5   -   Pain:  Knee Flexion:  Left: 5/5   -  Pain:Right: 5/5   -  Pain:  Knee Extension:  Left: 4+/5   ++  Pain:Right: 5/5    -  Pain:    Hip Special Testing:    Left hip negative for the following special tests:  Josue or Fadir/Labrum  Right hip negative for the following special tests:  Josue or Fadir/Labrum      Hip Palpation:  Normal     Assessment/Plan:    Patient is a 24 year old male with lumbar complaints.    Patient has the following significant findings with corresponding treatment plan.                Diagnosis 1:  Low back pain  Pain -  hot/cold therapy, US, electric stimulation, mechanical traction, manual therapy, self management, education, directional preference exercise and home program  Decreased strength - therapeutic exercise, therapeutic activities and home program  Impaired muscle performance - neuro re-education and home program  Decreased function - therapeutic activities and home program  Impaired posture - neuro re-education and home program    Therapy Evaluation Codes:   1) History comprised of:   Personal factors that impact the plan of care:      Profession.    Comorbidity factors that impact the plan of care are:      None.     Medications impacting care: None.  2) Examination of Body Systems comprised of:   Body structures and functions that impact the plan of care:      Lumbar spine.   Activity limitations that impact the plan of care are:      Bending, Lifting, Sitting, Squatting/kneeling and Working.  3) Clinical presentation characteristics are:   Stable/Uncomplicated.  4) Decision-Making    Low complexity using standardized patient assessment instrument and/or measureable assessment of functional outcome.    Cumulative Therapy Evaluation is: Low complexity.  Previous and current functional limitations:  (See Goal Flow Sheet for this information)    Short term and  Long term goals: (See Goal Flow Sheet for this information)   Communication ability:  Patient appears to be able to clearly communicate and understand verbal and written communication and follow directions correctly.  Treatment Explanation - The following has been discussed with the patient:   RX ordered/plan of care  Anticipated outcomes  Possible risks and side effects  This patient would benefit from PT intervention to resume normal activities.   Rehab potential is excellent.    Frequency:  1 X week, once daily  Duration:  for 6 weeks  Discharge Plan:  Achieve all LTG.  Independent in home treatment program.  Reach maximal therapeutic benefit.    Thank you for your referral.    INQUIRIES  Therapist: Elisabeth PRASAD [facility] [place] [place]  [address]  [place] [address]  Phone: [phone]  Fax: [phone]

## 2020-03-13 ENCOUNTER — THERAPY VISIT (OUTPATIENT)
Dept: PHYSICAL THERAPY | Facility: CLINIC | Age: 25
End: 2020-03-13
Payer: COMMERCIAL

## 2020-03-13 DIAGNOSIS — M54.50 LOW BACK PAIN: ICD-10-CM

## 2020-03-13 PROCEDURE — 97112 NEUROMUSCULAR REEDUCATION: CPT | Mod: GP | Performed by: PHYSICAL THERAPIST

## 2020-03-13 PROCEDURE — 97110 THERAPEUTIC EXERCISES: CPT | Mod: GP | Performed by: PHYSICAL THERAPIST

## 2020-03-13 PROCEDURE — 97530 THERAPEUTIC ACTIVITIES: CPT | Mod: GP | Performed by: PHYSICAL THERAPIST

## 2020-03-27 ENCOUNTER — THERAPY VISIT (OUTPATIENT)
Dept: PHYSICAL THERAPY | Facility: CLINIC | Age: 25
End: 2020-03-27
Payer: COMMERCIAL

## 2020-03-27 DIAGNOSIS — M54.50 LOW BACK PAIN: ICD-10-CM

## 2020-03-27 PROCEDURE — 99207 C NONPHYSICIAN TELEPHONE ASSESSMENT 11-20 MIN: CPT | Mod: GP | Performed by: PHYSICAL THERAPIST

## 2020-03-27 NOTE — PROGRESS NOTES
"Physical Therapy Virtual Follow Up      The patient has been notified of following:     \"This virtual visit will be conducted between you and your provider. We have found that certain health care needs can be provided without the need for physical presence.  This service lets us provide the care you need with a short virtual visit.\"    Due to external, as well as internal [facility] management of the COVID-19 Virus, Alexis Welsh was not seen in our clinic.  As a substitution, we implemented a virtual visit to manage this patient's condition utilizing the [employer] virtual visit platform via the patient s existing code.  The provider, Alana Singh, reviewed the patient's chart, PTRx prescription, and spoke with the patient to determine the following telemedicine visit is appropriate and effective for the patient's care.    The following type of visit was completed:   Telephone Visit:  A telephone visit was used in conjunction with the online PTRx exercise platform.          S: Alexis Welsh is a 24 year old male. Connected virtually on the [employer] platform with [place]is [place] to discuss their condition/progress. They noted improvements in pain level during activities of daily living.  They noted ongoing pain or limitations (fear avoidance) with squatting below parallel.     Current pain level: 0/10 at rest, 1/10 with squatting.  Has been completing his exercises every other day, and still fatigues by the end but reports the technique is getting easier to master.     O: Patient demonstrated a good understanding of updates to his HEP and progression of POC. The exercises below were reviewed or added to his program:     Neuromuscular Re-ed:  Exercise Name: Supine Abdominal Exercise #6 (Dead Bug), Sets: 3 - Reps: 10-20 - Sessions: 1, Notes: Keep the abdominal muscles tight. Remember to breathe.     Exercise Name: Plank With Leg Extension, Sets: 1-2 - Reps: Work your way up to 1 min with these 15 sec intervals - Sessions: " Every other day. , Notes: Hold 15 sec each leg    Exercise Name: Prone Plank With Arm Extension, Sets: 1-2 - Reps: Work your way up to 1 min with these 15 sec intervals - Sessions: Every other day. , Notes: Hold 15 sec each arm    Exercise Name: Prone Plank With Arm/Leg Extension, Sets: 1-2 - Reps: Work up to 1 min each side - Sessions: Every other day    Exercise Name: birddog, Sets: 2 - Reps: 10 each side - Sessions: Every other day. , Notes: Keep your back flat (imagine there is a cup of water on your back). Make sure your hips don't rotate.  Progression: Touch opposite elbow to opposite knee. OUT - TOUCH - OUT - DOWN - SWITCH    Therapeutic Exercise:  Exercise Name: Single Leg Bridge, Sets: 3 - Reps: 20 each side - Sessions: Every other day. , Notes: Don't arch with your back. If you get tired and lost form, stop. Slow and controlled.     Exercise Name: Body Mechanics - [employer] [place], Sets: 3 - Reps: 10 - Sessions: Every other day. , Notes: Add weight - challenge yourself.    Exercise Name: Single Leg Standing Deadlift, Sets: 2 - Reps: 10 - Sessions: Every other day. , Notes:   Weight in right hand if the left leg is working.     Exercise Name: Education Sheet Lumbar, Notes: 1)  Beast hold 3x1 min (on hands and knees, lift knees up)     Exercise Name: Squat, Sets: 2-3 - Reps: 10-20 - Sessions: Every other day., Notes: Add weight  if this feels okay. Don't go lower than 90 degrees right now.     A:   Patient is progressing well and symptoms are resolving. He is able to BW squat and complete activities around his home without pain. Progressed his core stability and dead lifting exercises today to further enhance trunk strength and control. He continues to express a lack of confidence with lifting heavy objects or sitting low into a squat. 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    P: Patient will continue with the  exercise program assigned on their PTRx code and will add the following measures to manage their pain/condition: new HEP exercises.   Will check back in 3 weeks to reassess sx and progress with exercise.        Virtual visit contact time    Time of service began: 1:30 PM  Time of service ended: 1:50 PM  Total Time for set up, visit, and documentation: 22 minutes    Procedure Code/s (For internal tracking only, please document any charges you would apply)  Therapeutic Exercise (30129): 10 minutes  Neuromuscular Re-education (02467): 10 minutes    I have reviewed the note as documented above.  This accurately captures the substance of my conversation with the patient.  Provider location: [facility] ([facility]State)  Patient location: At home    ___________________________________________________________________________________________________________________    Any subjective info about the quality of the visit, ease of use: Telephone visit. No problem with connection. Patient able to see exercises on his phone while talking to therapist.

## 2020-04-06 ENCOUNTER — TELEPHONE (OUTPATIENT)
Dept: PHYSICAL THERAPY | Facility: CLINIC | Age: 25
End: 2020-04-06

## 2020-04-13 ENCOUNTER — TELEPHONE (OUTPATIENT)
Dept: PHYSICAL THERAPY | Facility: CLINIC | Age: 25
End: 2020-04-13

## 2021-01-03 ENCOUNTER — HEALTH MAINTENANCE LETTER (OUTPATIENT)
Age: 26
End: 2021-01-03

## 2021-10-10 ENCOUNTER — HEALTH MAINTENANCE LETTER (OUTPATIENT)
Age: 26
End: 2021-10-10

## 2022-01-29 ENCOUNTER — HEALTH MAINTENANCE LETTER (OUTPATIENT)
Age: 27
End: 2022-01-29

## 2022-09-18 ENCOUNTER — HEALTH MAINTENANCE LETTER (OUTPATIENT)
Age: 27
End: 2022-09-18

## 2023-05-06 ENCOUNTER — HEALTH MAINTENANCE LETTER (OUTPATIENT)
Age: 28
End: 2023-05-06